# Patient Record
Sex: MALE | Race: OTHER
[De-identification: names, ages, dates, MRNs, and addresses within clinical notes are randomized per-mention and may not be internally consistent; named-entity substitution may affect disease eponyms.]

---

## 2017-05-27 PROBLEM — Z00.00 ENCOUNTER FOR PREVENTIVE HEALTH EXAMINATION: Status: ACTIVE | Noted: 2017-05-27

## 2017-06-13 ENCOUNTER — OTHER (OUTPATIENT)
Age: 62
End: 2017-06-13

## 2017-06-13 ENCOUNTER — APPOINTMENT (OUTPATIENT)
Dept: OTOLARYNGOLOGY | Facility: CLINIC | Age: 62
End: 2017-06-13

## 2017-06-13 VITALS
HEART RATE: 64 BPM | HEIGHT: 68 IN | DIASTOLIC BLOOD PRESSURE: 76 MMHG | SYSTOLIC BLOOD PRESSURE: 121 MMHG | BODY MASS INDEX: 30.01 KG/M2 | WEIGHT: 198 LBS

## 2017-08-22 ENCOUNTER — OTHER (OUTPATIENT)
Age: 62
End: 2017-08-22

## 2017-08-22 DIAGNOSIS — Z87.891 PERSONAL HISTORY OF NICOTINE DEPENDENCE: ICD-10-CM

## 2017-08-29 RX ORDER — DIAZEPAM 5 MG/1
5 TABLET ORAL
Qty: 2 | Refills: 0 | Status: ACTIVE | COMMUNITY
Start: 2017-08-22 | End: 1900-01-01

## 2017-09-10 ENCOUNTER — TRANSCRIPTION ENCOUNTER (OUTPATIENT)
Age: 62
End: 2017-09-10

## 2017-09-12 ENCOUNTER — APPOINTMENT (OUTPATIENT)
Dept: OTOLARYNGOLOGY | Facility: CLINIC | Age: 62
End: 2017-09-12
Payer: COMMERCIAL

## 2017-09-12 VITALS
SYSTOLIC BLOOD PRESSURE: 133 MMHG | DIASTOLIC BLOOD PRESSURE: 83 MMHG | WEIGHT: 186 LBS | BODY MASS INDEX: 28.19 KG/M2 | HEART RATE: 62 BPM | HEIGHT: 68 IN

## 2017-09-12 DIAGNOSIS — R20.0 ANESTHESIA OF SKIN: ICD-10-CM

## 2017-09-12 DIAGNOSIS — R20.2 ANESTHESIA OF SKIN: ICD-10-CM

## 2017-09-12 PROCEDURE — 99214 OFFICE O/P EST MOD 30 MIN: CPT

## 2020-04-29 ENCOUNTER — TRANSCRIPTION ENCOUNTER (OUTPATIENT)
Age: 65
End: 2020-04-29

## 2023-07-06 ENCOUNTER — APPOINTMENT (OUTPATIENT)
Dept: ORTHOPEDIC SURGERY | Facility: CLINIC | Age: 68
End: 2023-07-06
Payer: COMMERCIAL

## 2023-07-06 VITALS — WEIGHT: 189 LBS | HEIGHT: 67 IN | BODY MASS INDEX: 29.66 KG/M2 | RESPIRATION RATE: 16 BRPM

## 2023-07-06 DIAGNOSIS — M19.131 POST-TRAUMATIC OSTEOARTHRITIS, RIGHT WRIST: ICD-10-CM

## 2023-07-06 PROCEDURE — 73110 X-RAY EXAM OF WRIST: CPT | Mod: 50

## 2023-07-06 PROCEDURE — 99203 OFFICE O/P NEW LOW 30 MIN: CPT

## 2023-07-06 RX ORDER — AMLODIPINE BESYLATE 5 MG/1
TABLET ORAL
Refills: 0 | Status: ACTIVE | COMMUNITY

## 2023-07-06 RX ORDER — PANTOPRAZOLE 40 MG/1
TABLET, DELAYED RELEASE ORAL
Refills: 0 | Status: ACTIVE | COMMUNITY

## 2023-07-06 RX ORDER — LAMOTRIGINE 200 MG/1
200 TABLET ORAL
Refills: 0 | Status: ACTIVE | COMMUNITY

## 2023-07-06 RX ORDER — DICLOFENAC SODIUM 75 MG/1
TABLET, DELAYED RELEASE ORAL
Refills: 0 | Status: ACTIVE | COMMUNITY

## 2023-07-06 RX ORDER — SIMVASTATIN 80 MG/1
TABLET, FILM COATED ORAL
Refills: 0 | Status: ACTIVE | COMMUNITY

## 2023-07-06 RX ORDER — METOPROLOL TARTRATE 75 MG/1
TABLET, FILM COATED ORAL
Refills: 0 | Status: ACTIVE | COMMUNITY

## 2023-07-11 DIAGNOSIS — F40.240 CLAUSTROPHOBIA: ICD-10-CM

## 2023-07-11 RX ORDER — DIAZEPAM 5 MG/1
5 TABLET ORAL
Qty: 2 | Refills: 0 | Status: ACTIVE | COMMUNITY
Start: 2023-07-11 | End: 1900-01-01

## 2023-07-20 ENCOUNTER — APPOINTMENT (OUTPATIENT)
Dept: ORTHOPEDIC SURGERY | Facility: CLINIC | Age: 68
End: 2023-07-20
Payer: COMMERCIAL

## 2023-07-20 DIAGNOSIS — M19.132 POST-TRAUMATIC OSTEOARTHRITIS, LEFT WRIST: ICD-10-CM

## 2023-07-20 PROCEDURE — 99442: CPT

## 2023-08-23 ENCOUNTER — TRANSCRIPTION ENCOUNTER (OUTPATIENT)
Age: 68
End: 2023-08-23

## 2023-08-23 RX ORDER — SODIUM CHLORIDE 9 MG/ML
1000 INJECTION, SOLUTION INTRAVENOUS
Refills: 0 | Status: DISCONTINUED | OUTPATIENT
Start: 2023-08-24 | End: 2023-08-24

## 2023-08-23 RX ORDER — ACETAMINOPHEN 500 MG
1000 TABLET ORAL ONCE
Refills: 0 | Status: DISCONTINUED | OUTPATIENT
Start: 2023-08-24 | End: 2023-08-24

## 2023-08-23 RX ORDER — OXYCODONE HYDROCHLORIDE 5 MG/1
5 TABLET ORAL ONCE
Refills: 0 | Status: DISCONTINUED | OUTPATIENT
Start: 2023-08-24 | End: 2023-08-24

## 2023-08-23 RX ORDER — CEPHALEXIN 500 MG/1
500 TABLET ORAL 3 TIMES DAILY
Qty: 21 | Refills: 0 | Status: ACTIVE | COMMUNITY
Start: 2023-08-23 | End: 1900-01-01

## 2023-08-23 NOTE — ASU PATIENT PROFILE, ADULT - NS PREOP UNDERSTANDS INFO
spoke to patient to be NPO/NO solid foods after 12MN. allow to drink water  till3-   dress comfortable,  leave all valuable a  home,  Bring  ID photo and insurance cards, , escort arranged, address and telephone given to patient/yes

## 2023-08-23 NOTE — ASU DISCHARGE PLAN (ADULT/PEDIATRIC) - CARE PROVIDER_API CALL
Doug Orantes  [facility]  [address]  [place] [address]  Phone: [phone]  Fax: [phone]  Follow Up Time:

## 2023-08-23 NOTE — ASU PATIENT PROFILE, ADULT - AS SC BRADEN FRICTION
bilateral lower extremity Passive ROM was WFL (within functional limits)/Bilateral shoulders to 85 degrees, bilateral elbows/hands/wrist WFL
(3) no apparent problem

## 2023-08-23 NOTE — BRIEF OPERATIVE NOTE - NSICDXBRIEFPOSTOP_GEN_ALL_CORE_FT
POST-OP DIAGNOSIS:  Arthritis of carpometacarpal (CMC) joint of left thumb 23-Aug-2023 10:34:54  Linda Starks

## 2023-08-23 NOTE — ASU DISCHARGE PLAN (ADULT/PEDIATRIC) - ASU DC SPECIAL INSTRUCTIONSFT
Co-Directors: Doug Orantes MD; MD Buddy Gongora MD   [facility] [place] [facility] [address] 	  [place] [address] 	 	  Phone [phone] (HAND), Fax [phone]    [email or web address]    Hand Surgery Post Operative Instructions      DRESSING CARE:  1.Please keep bandage ON and DRY until you return to the office for your 1st postoperative visit.   2.In the shower you must cover bandage with a plastic bag. You can use tape or a rubber band so no water leaks into the bag.   3.Please do not exercise as that leads to excessive sweating as the bandage will therefore become moist/ wet.    4.Do not remove or change your bandage. You may apply more tape if dressing starts to unravel.   5.Please do not insert any objects, such as a pencil, down into the bandage.     ELEVATION:  1.Keep hand/wrist above heart level at all times or until bandage feels loose. This will help with swelling of the fingers and can be accomplished by using the FOAM PILLOW.   2.A sling will not hold your hand/wrist above your heart and therefore its use should be limited (it may also cause shoulder stiffness).     ACTIVITY:  1.Moving your fingers daily after surgery is very important to prevent stiffness. Please open your fingers completely and close your fingers completely to achieve full range of motion.  **UNLESS TENDON REPAIR OR NERVE REPAIR SURGERY PERFORMED    2.Move all joints of the extremity that are not immobilized to prevent stiffness (i.e. shoulder, elbow, fingers, and thumb unless instructed otherwise).   3.Avoid activities which may re-injure your hand or finger.     DIET:   Regular diet. Start light and progress as tolerated.     PAIN MEDICINE:   1.Pain medicine was sent to your pharmacy.  2.Take pain medicine on an “AS NEEDED” basis according to your doctor’s instructions.   3.Your pain will decrease over the next few days allowing you to:   •Decrease your pain medicine quantity until you stop.   •Increase the time between doses until you stop.   4.You should not drink alcoholic beverages while on pain medication.   5.Take pain medicine with food to prevent nausea.   6.Constipation can occur. If no bowel movement occurs within 48 hours take a laxative of your choice (over the counter).	 	      CONTACT PHYSICIAN FOR:   Slight pain, swelling and bluish discoloration are to be expected. If you have breathing difficulty or chest pain dial 911 immediately. However, if the following symptoms occur notify your physician:   •Temperature above 101° F 	        • Inability to urinate in 8 hours   •Uncontrolled nausea/vomiting   	  • Progressively increasing pain   •Signs of wound infection 	(Redness, swelling, pus-like drainage)                 • Excessive bleeding  • Increasing numbness   •Excessive swelling and tightness 	  • Splint or cast that is too tight      OFFICE APPOINTMENT:    A staff member from the office will call you in the next 1-2 days to schedule your 1st Post Operative appointment to see your physician back in the office. *IF someone does not reach out to you in the next 1-2 days please call the office.

## 2023-08-23 NOTE — ASU PATIENT PROFILE, ADULT - NSICDXPASTSURGICALHX_GEN_ALL_CORE_FT
PAST SURGICAL HISTORY:  No significant past surgical history      PAST SURGICAL HISTORY:  Cataract     History of appendectomy     History of arthroscopy of right shoulder     History of bunionectomy of right great toe     History of cholecystectomy

## 2023-08-23 NOTE — ASU PATIENT PROFILE, ADULT - NSICDXPASTMEDICALHX_GEN_ALL_CORE_FT
PAST MEDICAL HISTORY:  Enlarged prostate     GERD (gastroesophageal reflux disease)     Hypertension

## 2023-08-23 NOTE — BRIEF OPERATIVE NOTE - NSICDXBRIEFPREOP_GEN_ALL_CORE_FT
PRE-OP DIAGNOSIS:  Arthritis of carpometacarpal (CMC) joint of left thumb 23-Aug-2023 10:34:44  Linda Starks

## 2023-08-23 NOTE — ASU PATIENT PROFILE, ADULT - FALL HARM RISK - UNIVERSAL INTERVENTIONS
Bed in lowest position, wheels locked, appropriate side rails in place/Call bell, personal items and telephone in reach/Instruct patient to call for assistance before getting out of bed or chair/Non-slip footwear when patient is out of bed/[place] to call system/Physically safe environment - no spills, clutter or unnecessary equipment/Purposeful Proactive Rounding/Room/bathroom lighting operational, light cord in reach

## 2023-08-24 ENCOUNTER — OUTPATIENT (OUTPATIENT)
Dept: OUTPATIENT SERVICES | Facility: HOSPITAL | Age: 68
LOS: 1 days | Discharge: HOME | End: 2023-08-24

## 2023-08-24 ENCOUNTER — APPOINTMENT (OUTPATIENT)
Dept: ORTHOPEDIC SURGERY | Facility: AMBULATORY SURGERY CENTER | Age: 68
End: 2023-08-24
Payer: COMMERCIAL

## 2023-08-24 VITALS
SYSTOLIC BLOOD PRESSURE: 124 MMHG | OXYGEN SATURATION: 98 % | TEMPERATURE: 98 F | WEIGHT: 183.87 LBS | HEIGHT: 68 IN | DIASTOLIC BLOOD PRESSURE: 77 MMHG | RESPIRATION RATE: 16 BRPM | HEART RATE: 62 BPM

## 2023-08-24 VITALS
TEMPERATURE: 97 F | RESPIRATION RATE: 16 BRPM | HEART RATE: 58 BPM | DIASTOLIC BLOOD PRESSURE: 75 MMHG | OXYGEN SATURATION: 96 % | SYSTOLIC BLOOD PRESSURE: 126 MMHG

## 2023-08-24 DIAGNOSIS — Z98.890 OTHER SPECIFIED POSTPROCEDURAL STATES: Chronic | ICD-10-CM

## 2023-08-24 DIAGNOSIS — Z90.49 ACQUIRED ABSENCE OF OTHER SPECIFIED PARTS OF DIGESTIVE TRACT: Chronic | ICD-10-CM

## 2023-08-24 DIAGNOSIS — H26.9 UNSPECIFIED CATARACT: Chronic | ICD-10-CM

## 2023-08-24 PROCEDURE — 25447 ARTHRP NTRCRP/CRP/MTCR NTRPS: CPT | Mod: LT

## 2023-08-24 PROCEDURE — 26480 TRANSPLANT HAND TENDON: CPT | Mod: LT

## 2023-08-24 DEVICE — K-WIRE BRASSELER (EXTRA SHARP) DOUBLE DIAMOND 1.1MM X 4": Type: IMPLANTABLE DEVICE | Site: LEFT | Status: FUNCTIONAL

## 2023-08-24 RX ORDER — MESALAMINE 400 MG
2 TABLET, DELAYED RELEASE (ENTERIC COATED) ORAL
Refills: 0 | DISCHARGE

## 2023-08-24 RX ORDER — HYDROCHLOROTHIAZIDE 25 MG
1 TABLET ORAL
Refills: 0 | DISCHARGE

## 2023-08-24 RX ORDER — METOPROLOL TARTRATE 50 MG
1 TABLET ORAL
Refills: 0 | DISCHARGE

## 2023-08-24 RX ORDER — CHLORHEXIDINE GLUCONATE 213 G/1000ML
1 SOLUTION TOPICAL ONCE
Refills: 0 | Status: COMPLETED | OUTPATIENT
Start: 2023-08-24 | End: 2023-08-24

## 2023-08-24 RX ORDER — SIMVASTATIN 20 MG/1
1 TABLET, FILM COATED ORAL
Refills: 0 | DISCHARGE

## 2023-08-24 RX ORDER — TAMSULOSIN HYDROCHLORIDE 0.4 MG/1
1 CAPSULE ORAL
Refills: 0 | DISCHARGE

## 2023-08-24 RX ORDER — PANTOPRAZOLE SODIUM 20 MG/1
1 TABLET, DELAYED RELEASE ORAL
Refills: 0 | DISCHARGE

## 2023-08-24 RX ADMIN — CHLORHEXIDINE GLUCONATE 1 APPLICATION(S): 213 SOLUTION TOPICAL at 10:41

## 2023-08-28 ENCOUNTER — APPOINTMENT (OUTPATIENT)
Dept: ORTHOPEDIC SURGERY | Facility: CLINIC | Age: 68
End: 2023-08-28
Payer: COMMERCIAL

## 2023-08-28 PROBLEM — K21.9 GASTRO-ESOPHAGEAL REFLUX DISEASE WITHOUT ESOPHAGITIS: Chronic | Status: ACTIVE | Noted: 2023-08-23

## 2023-08-28 PROBLEM — I10 ESSENTIAL (PRIMARY) HYPERTENSION: Chronic | Status: ACTIVE | Noted: 2023-08-23

## 2023-08-28 PROBLEM — N40.0 BENIGN PROSTATIC HYPERPLASIA WITHOUT LOWER URINARY TRACT SYMPTOMS: Chronic | Status: ACTIVE | Noted: 2023-08-23

## 2023-08-28 PROCEDURE — 73110 X-RAY EXAM OF WRIST: CPT | Mod: LT

## 2023-08-28 PROCEDURE — 29075 APPL CST ELBW FNGR SHORT ARM: CPT | Mod: 58

## 2023-08-28 PROCEDURE — 99024 POSTOP FOLLOW-UP VISIT: CPT

## 2023-08-28 RX ORDER — OXYCODONE AND ACETAMINOPHEN 5; 325 MG/1; MG/1
5-325 TABLET ORAL
Qty: 20 | Refills: 0 | Status: ACTIVE | COMMUNITY
Start: 2023-08-23 | End: 1900-01-01

## 2023-08-28 NOTE — HISTORY OF PRESENT ILLNESS
[___ Days Post Op] : post op day #[unfilled] [de-identified] : DOS: 8/24/23 [de-identified] : 4 days s/p Left basal joint arthroplasty Patient has no side effects from the antibiotics. Patient complains of swelling without trauma He presents today without his foam block.  He states he has been wearing it at home but when he goes out he prefers to carry the arm without the block. [de-identified] : There is no evidence of infection the pin is clean and dry but there is significant swelling of the entire left hand.  Is moving the digits poorly.  Negative Tinel's there is good capillary refill sensation grossly intact [de-identified] : PA lateral and oblique of the left hand shows a small band in the hardware but the hardware itself is intact. [de-identified] : 4 days s/p Left basal joint arthroplasty  [de-identified] : Stressed the importance of elevation to reduce swelling and activity modifications to reduce of complications. Vitamin C prophylaxis discussed He will continue with his oral antibiotics. A short arm thumb spica cast was applied in a loose fashion.  Patient felt that this was comfortable but he was advised that if it feels tight return to the office for bivalve.   Return to the office September 6 earlier if there are issues which were discussed

## 2023-09-06 ENCOUNTER — APPOINTMENT (OUTPATIENT)
Dept: ORTHOPEDIC SURGERY | Facility: CLINIC | Age: 68
End: 2023-09-06
Payer: COMMERCIAL

## 2023-09-06 PROCEDURE — 29075 APPL CST ELBW FNGR SHORT ARM: CPT | Mod: 58,LT

## 2023-09-06 PROCEDURE — 73110 X-RAY EXAM OF WRIST: CPT | Mod: LT

## 2023-09-06 PROCEDURE — 99024 POSTOP FOLLOW-UP VISIT: CPT

## 2023-09-06 NOTE — HISTORY OF PRESENT ILLNESS
[___ Days Post Op] : post op day #[unfilled] [de-identified] : DOS: 8/24/23 [de-identified] : 13 days s/p Left basal joint arthroplasty   [de-identified] : Pin is clean and dry no evidence of infection.  Moving the wrist and digits well without evidence of crepitus or instability good capillary refill negative Tinel's sensation grossly intact. [de-identified] : PA lateral and oblique shows hardware without significant changes compared to previous x-ray. [de-identified] : 13 days s/p Left basal joint arthroplasty  [de-identified] : A short arm thumb spica hand cast was applied and home program outlined to reduce risk of complications.  Return to the office in 1-1/2 weeks

## 2023-09-18 ENCOUNTER — APPOINTMENT (OUTPATIENT)
Dept: ORTHOPEDIC SURGERY | Facility: CLINIC | Age: 68
End: 2023-09-18
Payer: COMMERCIAL

## 2023-09-18 PROCEDURE — 73110 X-RAY EXAM OF WRIST: CPT | Mod: LT

## 2023-09-18 PROCEDURE — 99024 POSTOP FOLLOW-UP VISIT: CPT

## 2023-09-20 ENCOUNTER — APPOINTMENT (OUTPATIENT)
Dept: ORTHOPEDIC SURGERY | Facility: CLINIC | Age: 68
End: 2023-09-20
Payer: COMMERCIAL

## 2023-09-20 PROCEDURE — 73110 X-RAY EXAM OF WRIST: CPT | Mod: LT

## 2023-09-20 PROCEDURE — 99024 POSTOP FOLLOW-UP VISIT: CPT

## 2023-10-04 ENCOUNTER — APPOINTMENT (OUTPATIENT)
Dept: ORTHOPEDIC SURGERY | Facility: CLINIC | Age: 68
End: 2023-10-04
Payer: COMMERCIAL

## 2023-10-04 PROCEDURE — 99024 POSTOP FOLLOW-UP VISIT: CPT

## 2023-10-04 PROCEDURE — 73110 X-RAY EXAM OF WRIST: CPT | Mod: LT

## 2023-10-04 RX ORDER — METHYLPREDNISOLONE 4 MG/1
4 TABLET ORAL
Qty: 1 | Refills: 1 | Status: ACTIVE | COMMUNITY
Start: 2023-09-20 | End: 1900-01-01

## 2023-10-18 ENCOUNTER — APPOINTMENT (OUTPATIENT)
Dept: ORTHOPEDIC SURGERY | Facility: CLINIC | Age: 68
End: 2023-10-18
Payer: COMMERCIAL

## 2023-10-18 PROCEDURE — 73110 X-RAY EXAM OF WRIST: CPT | Mod: LT

## 2023-10-18 PROCEDURE — 99024 POSTOP FOLLOW-UP VISIT: CPT

## 2023-10-18 RX ORDER — METHYLPREDNISOLONE 4 MG/1
4 TABLET ORAL
Qty: 1 | Refills: 1 | Status: ACTIVE | COMMUNITY
Start: 2023-10-18 | End: 1900-01-01

## 2023-10-18 RX ORDER — AMITRIPTYLINE HYDROCHLORIDE 25 MG/1
25 TABLET, FILM COATED ORAL 3 TIMES DAILY
Qty: 90 | Refills: 1 | Status: ACTIVE | COMMUNITY
Start: 2023-10-04 | End: 1900-01-01

## 2023-11-17 ENCOUNTER — APPOINTMENT (OUTPATIENT)
Dept: ORTHOPEDIC SURGERY | Facility: CLINIC | Age: 68
End: 2023-11-17
Payer: COMMERCIAL

## 2023-11-17 PROCEDURE — 99024 POSTOP FOLLOW-UP VISIT: CPT

## 2023-11-17 PROCEDURE — 73110 X-RAY EXAM OF WRIST: CPT | Mod: LT

## 2023-12-29 ENCOUNTER — APPOINTMENT (OUTPATIENT)
Dept: ORTHOPEDIC SURGERY | Facility: CLINIC | Age: 68
End: 2023-12-29

## 2024-02-05 ENCOUNTER — APPOINTMENT (OUTPATIENT)
Dept: ORTHOPEDIC SURGERY | Facility: CLINIC | Age: 69
End: 2024-02-05
Payer: COMMERCIAL

## 2024-02-05 VITALS — RESPIRATION RATE: 16 BRPM | WEIGHT: 189 LBS | HEIGHT: 67 IN | BODY MASS INDEX: 29.66 KG/M2

## 2024-02-05 PROCEDURE — 99213 OFFICE O/P EST LOW 20 MIN: CPT

## 2024-02-05 PROCEDURE — 73110 X-RAY EXAM OF WRIST: CPT | Mod: LT

## 2024-02-05 NOTE — PHYSICAL EXAM
[de-identified] : There is a well-healed scar there is a negative grind no instability or tenderness of the CMC joint.  No tenderness over the first dorsal compartment or A1 pulley.  There is discomfort over the scapholunate ligament interval of the left wrist with pain upon forced flexion and extension.  Flexion/extension is 80/60 symmetric bilaterally  Abduction 90 degrees symmetric bilaterally  Flexion is to the palmar crease on the right and the palmar digital crease on the left There is tenderness of the A1 pulley of the left second greater than third digits but no locking upon compression no tenderness over the MP PIP and DIP joint and he is able to make a full fist with the second through fifth digits.  strength 45 pounds on the right 40 pounds on the left Pinch strength 11 pounds on the right and 7 pounds on the left Right-hand-dominant [de-identified] : The left thumb CMC joint appears to be well-preserved.  There is a diastases of the left wrist scapholunate ligament interval which is without significant changes compared to previous x-ray

## 2024-02-05 NOTE — HISTORY OF PRESENT ILLNESS
[Right] : right hand dominant [FreeTextEntry1] : Date of surgery: August 24, 2023  Patient in 5 and half months status post left basal joint arthroplasty.  At last visit patient completed Medrol Dosepak and was on Elavil for his complex regional pain. Patient finished therapy in December 2023 and has continued exercises at home. He has noticed improvements. He has noticed tightness in dorsal aspect of thumb/wrist.

## 2024-02-05 NOTE — ASSESSMENT
[FreeTextEntry1] : 5-1/2-month status post left thumb CMC arthroplasty doing well.  Patient continues to have a flexor tendinitis of the left second and third digits which is slowly improving over time.  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  The risks, benefits, alternatives and associated differential diagnosis was discussed with the patient. Options ranged from conservative care, therapy to surgical intervention were reviewed and all questions answered. Risks included incomplete resolution of symptoms, worsening of symptoms recurrence, tissue loss, functional loss and other risks associated with treatment of this condition Patient appeared to have an excellent understanding of the risks as well as differential diagnosis associated with this condition.  Since the left second and third flexor tendinitis is improving over time he elected continued conservative management over injection.  He is also having some residual discomfort from the scapholunate ligament dissociation.  This condition is livable and much better than it was prior to surgery.  He elected a home program over formal therapy.  If symptoms do not resolve over the next several months he can return to the office for reevaluation and more aggressive forms of treatment such as injection may be considered.  If symptoms resolve he can follow on an as-needed basis.

## 2024-05-06 ENCOUNTER — APPOINTMENT (OUTPATIENT)
Dept: ORTHOPEDIC SURGERY | Facility: CLINIC | Age: 69
End: 2024-05-06
Payer: COMMERCIAL

## 2024-05-06 VITALS — WEIGHT: 189 LBS | RESPIRATION RATE: 16 BRPM | HEIGHT: 67 IN | BODY MASS INDEX: 29.66 KG/M2

## 2024-05-06 DIAGNOSIS — M19.031 PRIMARY OSTEOARTHRITIS, RIGHT WRIST: ICD-10-CM

## 2024-05-06 DIAGNOSIS — M65.322 TRIGGER FINGER, LEFT INDEX FINGER: ICD-10-CM

## 2024-05-06 DIAGNOSIS — M65.332 TRIGGER FINGER, LEFT MIDDLE FINGER: ICD-10-CM

## 2024-05-06 DIAGNOSIS — G90.519 COMPLEX REGIONAL PAIN SYNDROME I OF UNSPECIFIED UPPER LIMB: ICD-10-CM

## 2024-05-06 DIAGNOSIS — M19.032 PRIMARY OSTEOARTHRITIS, LEFT WRIST: ICD-10-CM

## 2024-05-06 PROCEDURE — 73110 X-RAY EXAM OF WRIST: CPT | Mod: LT

## 2024-05-06 PROCEDURE — 99214 OFFICE O/P EST MOD 30 MIN: CPT | Mod: 25

## 2024-05-06 PROCEDURE — 20600 DRAIN/INJ JOINT/BURSA W/O US: CPT

## 2024-05-06 NOTE — PHYSICAL EXAM
[de-identified] : Scar is well-healed over the left side without evidence of instability of the CMC joint.  There is tenderness over the radial carpal articulation with pain exacerbated by forced flexion and extension.  No tenderness over the CMC joint no tenderness over the MP IP first dorsal compartment A1 or FCR tendon.  Flexion/extension 80/60 on the right 70/50 on the left with pain upon forced flexion and extension.  There is a positive Schaffer test on the left negative on the right  Abduction 90 degrees symmetric bilaterally Flexion to the palmar crease on the right digital crease on the left Tenderness over the A1 pulley of the Left second and third digits without locking upon compression full range of motion of the digits with active equaling passive range of motion.   strength 70 pounds on the right 40 pounds on the left  Right-hand-dominant  There is good capillary refill of the digits bilaterally.There is no masses or sensitivity over the median and ulnar nerves at the level of the wrist. There is a negative Tinel's and negative Phalen's sign bilaterally. The sensation is grossly intact bilaterally. [de-identified] : PA lateral and oblique of the left hand shows no major changes or calcifications of the newly created CMC joint.  The joint space narrowing of the radiocarpal as well as scapholunate ligament on the left side is unchanged as compared to previous x-rays from July 6, 2023.

## 2024-05-06 NOTE — HISTORY OF PRESENT ILLNESS
[Right] : right hand dominant [FreeTextEntry1] : Date of surgery: August 24, 2023  Patient in 8 and half month's status post left basal joint arthroplasty. Patient complains of tightness around the left CMC joint.

## 2024-05-06 NOTE — ASSESSMENT
[FreeTextEntry1] : Patient's reconstructed CMC joint has minimal symptoms.  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 Differential diagnosis discussed. Patient also has a left second and third trigger finger without locking  This is failed to resolve despite rest activity modification splinting and therapy in the past.  Options were discussed ranging from conservative management, therapy, splinting, further workup, injections, or reconstructions.  Risk associated with each option discussed and all questions answered.  He elected to proceed with an injection into the left wrist radiocarpal joint  After the area was cleaned with alcohol to reduce the risk of infection 1 cc of Kenalog 10 and 1 cc of 2% lidocaine was placed into the radiocarpal joint.  Hemostasis was obtained by reynolds rec pressure and a Band-Aid applied.  He elected observation for the left second and third trigger fingers.  If the injection helps the radiocarpal joint he may consider returning for the injections of the fingers.  If the symptoms do not improve we will proceed with an MRI and based on results more aggressive forms of treatment may be considered.

## 2024-10-07 ENCOUNTER — APPOINTMENT (OUTPATIENT)
Dept: ORTHOPEDIC SURGERY | Facility: CLINIC | Age: 69
End: 2024-10-07
Payer: COMMERCIAL

## 2024-10-07 VITALS — WEIGHT: 189 LBS | BODY MASS INDEX: 29.66 KG/M2 | HEIGHT: 67 IN | RESPIRATION RATE: 16 BRPM

## 2024-10-07 DIAGNOSIS — M65.332 TRIGGER FINGER, LEFT MIDDLE FINGER: ICD-10-CM

## 2024-10-07 DIAGNOSIS — G90.519 COMPLEX REGIONAL PAIN SYNDROME I OF UNSPECIFIED UPPER LIMB: ICD-10-CM

## 2024-10-07 DIAGNOSIS — M19.031 PRIMARY OSTEOARTHRITIS, RIGHT WRIST: ICD-10-CM

## 2024-10-07 DIAGNOSIS — M65.322 TRIGGER FINGER, LEFT INDEX FINGER: ICD-10-CM

## 2024-10-07 PROCEDURE — 20550 NJX 1 TENDON SHEATH/LIGAMENT: CPT | Mod: LT

## 2024-10-07 PROCEDURE — 99214 OFFICE O/P EST MOD 30 MIN: CPT | Mod: 25

## (undated) DEVICE — BLADE SCALPEL SAFETY #15 WITH PLASTIC GREEN HANDLE

## (undated) DEVICE — SLV COMPRESSION KNEE MED

## (undated) DEVICE — MARKING PEN W RULER

## (undated) DEVICE — TOURNIQUET CUFF 18" DUAL PORT SINGLE BLADDER W PLC  (BLACK)

## (undated) DEVICE — SUT SILK 4-0 18" PS-2

## (undated) DEVICE — SUT ETHILON 5-0 18" P-3

## (undated) DEVICE — WARMING BLANKET LOWER ADULT

## (undated) DEVICE — PACK HAND

## (undated) DEVICE — BUR TPRD SIDE CTTNG MED

## (undated) DEVICE — GLV 8 PROTEXIS (WHITE)